# Patient Record
Sex: FEMALE
[De-identification: names, ages, dates, MRNs, and addresses within clinical notes are randomized per-mention and may not be internally consistent; named-entity substitution may affect disease eponyms.]

---

## 2023-03-08 ENCOUNTER — APPOINTMENT (OUTPATIENT)
Dept: DERMATOLOGY | Facility: CLINIC | Age: 63
End: 2023-03-08
Payer: COMMERCIAL

## 2023-03-08 VITALS — WEIGHT: 125 LBS | HEIGHT: 66 IN | BODY MASS INDEX: 20.09 KG/M2

## 2023-03-08 DIAGNOSIS — L72.0 EPIDERMAL CYST: ICD-10-CM

## 2023-03-08 DIAGNOSIS — L81.8 OTHER SPECIFIED DISORDERS OF PIGMENTATION: ICD-10-CM

## 2023-03-08 DIAGNOSIS — Z78.9 OTHER SPECIFIED HEALTH STATUS: ICD-10-CM

## 2023-03-08 DIAGNOSIS — Z12.83 ENCOUNTER FOR SCREENING FOR MALIGNANT NEOPLASM OF SKIN: ICD-10-CM

## 2023-03-08 DIAGNOSIS — D18.01 HEMANGIOMA OF SKIN AND SUBCUTANEOUS TISSUE: ICD-10-CM

## 2023-03-08 DIAGNOSIS — D22.9 MELANOCYTIC NEVI, UNSPECIFIED: ICD-10-CM

## 2023-03-08 DIAGNOSIS — L71.0 PERIORAL DERMATITIS: ICD-10-CM

## 2023-03-08 PROBLEM — Z00.00 ENCOUNTER FOR PREVENTIVE HEALTH EXAMINATION: Status: ACTIVE | Noted: 2023-03-08

## 2023-03-08 PROCEDURE — 99204 OFFICE O/P NEW MOD 45 MIN: CPT

## 2023-03-08 RX ORDER — ROSUVASTATIN CALCIUM 10 MG/1
10 TABLET, FILM COATED ORAL
Refills: 0 | Status: ACTIVE | COMMUNITY

## 2023-03-08 RX ORDER — ESTRADIOL AND PROGESTERONE 1; 100 MG/1; MG/1
1-100 CAPSULE ORAL
Refills: 0 | Status: ACTIVE | COMMUNITY

## 2023-03-08 RX ORDER — IBANDRONATE SODIUM 150 MG/1
TABLET ORAL
Refills: 0 | Status: ACTIVE | COMMUNITY

## 2023-03-08 RX ORDER — PIMECROLIMUS 10 MG/G
1 CREAM TOPICAL TWICE DAILY
Qty: 1 | Refills: 2 | Status: ACTIVE | COMMUNITY
Start: 2023-03-08 | End: 1900-01-01

## 2023-03-08 RX ORDER — METRONIDAZOLE 7.5 MG/G
0.75 CREAM TOPICAL
Qty: 1 | Refills: 3 | Status: ACTIVE | COMMUNITY
Start: 2023-03-08 | End: 1900-01-01

## 2023-03-08 NOTE — HISTORY OF PRESENT ILLNESS
[FreeTextEntry1] : Moles and rash on chin [de-identified] : # Mole/skin check \par Concerns today: Brown and white spots, white spots on forehead\par Sunscreen use: Uses SPF on face and body\par Hx of blistering sun burns: None\par Hx of immunosuppression: None\par \par Personal history of skin cancer: None\par Family history of skin cancer: None\par \par # Rash \par Onset: 4 months\par Location: chin, around nose, around mouth\par Symptoms: itchy, burns\par Previous treatments and response: betamethasone dipropionate cream 0.05%, Tazorac cream \par Denies inhaled corticosteroids, denies changes in toothpaste, denies teeth whiteners

## 2023-03-08 NOTE — ASSESSMENT
[FreeTextEntry1] : # Periorificial dermatitis \par - Discussed condition and associated factors\par - Avoid topical steroids and topical retinoids\par - Start metrocream 0.75% BID\par - Start pimecrolimus cream BID \par - Rx sent to [address] [place] [address] apothe[facility]ary; copayment may be required\par - For flares can do short course of doxycycline\par \par # IGH\par - Benign, reassured\par \par # SK\par - Benign, reassured\par \par # Cherry angiomas\par - Benign, reassured\par \par # Multiple nevi\par - Benign appearing on today's examination\par - Monitor for change\par \par # Milia\par - Referred to [facility] for extractions/facial\par \par # Skin cancer screening\par - A complete skin exam was performed\par - No concerning lesions identified\par - Photoprotection was discussed including sunscreen\par - Call for new or changing lesions\par - CBE yearly\par \par FU 3 months

## 2023-03-08 NOTE — PHYSICAL EXAM
[Alert] : alert [Oriented x 3] : ~L oriented x 3 [Full Body Skin Exam Performed] : performed [FreeTextEntry3] : Pt consented to examination of external genitalia and buttocks\par \par Small erythematous papules scattered\par Stuck on brown papules scattered\par Symmetric brown macules. No ABCD features. No concerning features on dermoscopy.\par White guttate macules on skin\par Erythematous monomorphic papules on chin (photo with erythematous monomorphic papules on periorifical sites.\par

## 2023-03-15 ENCOUNTER — NON-APPOINTMENT (OUTPATIENT)
Age: 63
End: 2023-03-15

## 2023-04-27 RX ORDER — DOXYCYCLINE 100 MG/1
100 CAPSULE ORAL
Qty: 60 | Refills: 0 | Status: ACTIVE | COMMUNITY
Start: 2023-04-27 | End: 1900-01-01

## 2023-04-28 ENCOUNTER — TRANSCRIPTION ENCOUNTER (OUTPATIENT)
Age: 63
End: 2023-04-28

## 2023-06-07 ENCOUNTER — APPOINTMENT (OUTPATIENT)
Dept: DERMATOLOGY | Facility: CLINIC | Age: 63
End: 2023-06-07